# Patient Record
Sex: FEMALE | Race: WHITE
[De-identification: names, ages, dates, MRNs, and addresses within clinical notes are randomized per-mention and may not be internally consistent; named-entity substitution may affect disease eponyms.]

---

## 2018-09-16 ENCOUNTER — HOSPITAL ENCOUNTER (EMERGENCY)
Dept: HOSPITAL 33 - ED | Age: 58
Discharge: ANOTHER HOSPITAL | End: 2018-09-16
Payer: COMMERCIAL

## 2018-09-16 VITALS — DIASTOLIC BLOOD PRESSURE: 62 MMHG | HEART RATE: 64 BPM | OXYGEN SATURATION: 95 % | SYSTOLIC BLOOD PRESSURE: 98 MMHG
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
## 2018-09-16 DIAGNOSIS — Z85.01: ICD-10-CM
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
## 2018-09-16 DIAGNOSIS — J95.02: Primary | ICD-10-CM

## 2018-09-16 LAB
ALBUMIN SERPL-MCNC: 3.7 G/DL (ref 3.5–5)
ALP SERPL-CCNC: 135 U/L (ref 38–126)
ALT SERPL-CCNC: 14 U/L (ref 0–35)
ANION GAP SERPL CALC-SCNC: 14.8 MEQ/L (ref 5–15)
ANISOCYTOSIS BLD QL: (no result)
AST SERPL QL: 21 U/L (ref 14–36)
BILIRUB BLD-MCNC: 0.5 MG/DL (ref 0.2–1.3)
BUN SERPL-MCNC: 9 MG/DL (ref 7–17)
CALCIUM SPEC-MCNC: 10.7 MG/DL (ref 8.4–10.2)
CELLS COUNTED: 100
CHLORIDE SERPL-SCNC: 94 MMOL/L (ref 98–107)
CO2 SERPL-SCNC: 28 MMOL/L (ref 22–30)
CREAT SERPL-MCNC: 0.62 MG/DL (ref 0.52–1.04)
GLUCOSE SERPL-MCNC: 107 MG/DL (ref 74–106)
GLUCOSE UR-MCNC: NEGATIVE MG/DL
GRANULOCYTES # BLD AUTO: 15.52 10*3/UL (ref 1.4–6.9)
HCT VFR BLD AUTO: 27.7 % (ref 35–47)
HGB BLD-MCNC: 8.9 GM/DL (ref 12–16)
MANUAL DIF COMMENT BLD-IMP: (no result)
MCH RBC QN AUTO: 22.9 PG (ref 26–32)
MCHC RBC AUTO-ENTMCNC: 32.1 G/DL (ref 32–36)
NEUTS BAND # BLD MANUAL: 3 % (ref 0–2)
PLATELET # BLD AUTO: 661 K/MM3 (ref 150–450)
POIKILOCYTOSIS BLD QL SMEAR: (no result)
POLYCHROMASIA BLD QL SMEAR: (no result)
POTASSIUM SERPLBLD-SCNC: 3.8 MMOL/L (ref 3.5–5.1)
PROT SERPL-MCNC: 7.4 G/DL (ref 6.3–8.2)
PROT UR STRIP-MCNC: NEGATIVE MG/DL
RBC # BLD AUTO: 3.88 M/MM3 (ref 4.1–5.4)
RBC # URNS HPF: (no result) /HPF (ref 0–2)
SODIUM SERPL-SCNC: 133 MMOL/L (ref 137–145)
T VAGINALIS URNS QL MICRO: PRESENT /HPF
WBC # BLD AUTO: 17.5 K/MM3 (ref 4–10.5)
WBC URNS QL MICRO: (no result) /HPF (ref 0–5)

## 2018-09-16 PROCEDURE — 36415 COLL VENOUS BLD VENIPUNCTURE: CPT

## 2018-09-16 PROCEDURE — 80053 COMPREHEN METABOLIC PANEL: CPT

## 2018-09-16 PROCEDURE — 87077 CULTURE AEROBIC IDENTIFY: CPT

## 2018-09-16 PROCEDURE — 93005 ELECTROCARDIOGRAM TRACING: CPT

## 2018-09-16 PROCEDURE — 84484 ASSAY OF TROPONIN QUANT: CPT

## 2018-09-16 PROCEDURE — 71046 X-RAY EXAM CHEST 2 VIEWS: CPT

## 2018-09-16 PROCEDURE — 99285 EMERGENCY DEPT VISIT HI MDM: CPT

## 2018-09-16 PROCEDURE — 96360 HYDRATION IV INFUSION INIT: CPT

## 2018-09-16 PROCEDURE — 85025 COMPLETE CBC W/AUTO DIFF WBC: CPT

## 2018-09-16 PROCEDURE — 70491 CT SOFT TISSUE NECK W/DYE: CPT

## 2018-09-16 PROCEDURE — 71260 CT THORAX DX C+: CPT

## 2018-09-16 PROCEDURE — 83605 ASSAY OF LACTIC ACID: CPT

## 2018-09-16 PROCEDURE — 96361 HYDRATE IV INFUSION ADD-ON: CPT

## 2018-09-16 PROCEDURE — 87086 URINE CULTURE/COLONY COUNT: CPT

## 2018-09-16 PROCEDURE — 87040 BLOOD CULTURE FOR BACTERIA: CPT

## 2018-09-16 PROCEDURE — 87070 CULTURE OTHR SPECIMN AEROBIC: CPT

## 2018-09-16 PROCEDURE — 81000 URINALYSIS NONAUTO W/SCOPE: CPT

## 2018-09-16 PROCEDURE — 36000 PLACE NEEDLE IN VEIN: CPT

## 2018-09-16 PROCEDURE — 87186 SC STD MICRODIL/AGAR DIL: CPT

## 2018-09-16 NOTE — ERPHSYRPT
- History of Present Illness


Time Seen by Provider: 09/16/18 13:04


Source: patient, EMS


Exam Limitations: no limitations


Physician History: 





The patient is a 58-year-old female with a history of throat and esophageal 

cancer is brought in from home by ambulance where she fell onto her buttocks 

and also hitting her head on the floor.  She has a tracheostomy.  At this time 

she has no specific complaints.  She states she cleans her tracheostomy site 2 

times a day; however there is a very foul odor coming from the site around the 

tracheostomy when you enter the room.  Her past medical history significant for 

throat and esophageal cancer.  She is undergoing chemotherapy for the cancer.  

She has her next chemotherapy session on Thursday.


Timing/Duration: today


Severity: mild


Modifying Factors: Improves With: nothing


Associated Symptoms: denies symptoms


Allergies/Adverse Reactions: 








Sulfa (Sulfonamide Antibiotics) Allergy (Verified 09/16/18 13:06)


 








- Review of Systems


Constitutional: No Fever, No Chills


Eyes: No Symptoms


Ears, Nose, & Throat: No Symptoms


Respiratory: No Cough, No Dyspnea


Cardiac: No Chest Pain, No Edema, No Syncope


Abdominal/Gastrointestinal: No Abdominal Pain, No Nausea, No Vomiting, No 

Diarrhea


Genitourinary Symptoms: No Dysuria


Musculoskeletal: No Back Pain, No Neck Pain


Skin: No Rash


Neurological: No Dizziness, No Focal Weakness, No Sensory Changes


Psychological: No Symptoms


Endocrine: No Symptoms


Hematologic/Lymphatic: No Symptoms


Immunological/Allergic: No Symptoms


All Other Systems: Reviewed and Negative





- Nursing Vital Signs


Nursing Vital Signs: 


 Initial Vital Signs











Temperature  98.4 F   09/16/18 12:40


 


Pulse Rate  90   09/16/18 12:40


 


Blood Pressure  102/65   09/16/18 12:40


 


O2 Sat by Pulse Oximetry  95   09/16/18 12:40








 Pain Scale











Pain Intensity                 0

















- Physical Exam


General Appearance: no apparent distress, alert


Eye Exam: PERRL/EOMI, eyes nml inspection


Ears, Nose, Throat Exam: other (Examination of the patient's neck: Respiratory 

therapy removed the trach.  There was significant amounts of foul-smelling 

discharge around the tracheostomy site.  There was thick exudate on the trach 

itself.  There is a open lesion at the base of the front of the left side of 

the neck with granulomatous tissue surrounding the open lesion.)


Neck Exam: other (see ENT)


Respiratory Exam: normal breath sounds, lungs clear, No respiratory distress


Cardiovascular Exam: regular rate/rhythm, normal heart sounds, normal 

peripheral pulses


Gastrointestinal/Abdomen Exam: soft, normal bowel sounds, No tenderness, No mass


Pelvic Exam: not done


Rectal Exam: not done


Back Exam: normal inspection, normal range of motion, No CVA tenderness, No 

vertebral tenderness


Extremity Exam: normal inspection, normal range of motion, pelvis stable


Neurologic Exam: alert, oriented x 3, cooperative, normal mood/affect, nml 

cerebellar function, nml station & gait, sensation nml, No motor deficits


Skin Exam: other (see ENT)


Lymphatic Exam: No adenopathy


**SpO2 Interpretation**: normal


Oxygen Delivery: Room Air





- Course


EKG Interpreted by Me: RATE, Sinus Rhythm, NORMAL AXIS, NORMAL INTERVALS, 

NORMAL QRS, NORMAL ST-T





- CT Exams


  ** Soft Tissue Neck


CT Interpretation: Tele-radiologist Report (per Dr Ellis), Other (lobulated 

soft tissue mass in region of hypopharynx; small ostomy in left lateral neckin 

region of lobulated mass)





  ** Chest


CT Interpretation: Tele-radiologist Report (per Dr Ellis), Other (COPD; mild 

ground glass opacitiesl)


Ordered Tests: 


 Active Orders 24 hr











 Category Date Time Status


 


 Clean Catch Urine Specimen STAT Care  09/16/18 13:11 Active


 


 EKG-ER Only STAT Care  09/16/18 13:11 Active


 


 IV Insertion STAT Care  09/16/18 13:11 Active


 


 CHEST 2 VIEWS (PA AND LAT) Stat Exams  09/16/18 14:07 Taken


 


 CHEST WITH CONTRAST [CT] Stat Exams  09/16/18 15:18 Taken


 


 NECK WITH CONTRAST [CT] Stat Exams  09/16/18 14:18 Taken


 


 BLOOD CULTURE Stat Lab  09/16/18 13:46 Received


 


 CBC W DIFF Stat Lab  09/16/18 13:40 Completed


 


 CMP Stat Lab  09/16/18 13:40 Completed


 


 CULTURE,URINE Stat Lab  09/16/18 13:52 Received


 


 CULTURE,WOUND Stat Lab  09/16/18 13:00 Received


 


 Lactic Acid Stat Lab  09/16/18 13:42 Completed


 


 Manual Differential NC Stat Lab  09/16/18 13:40 Completed


 


 TROPONIN Q3H Lab  09/16/18 13:15 Completed


 


 TROPONIN Q3H Lab  09/16/18 16:18 Completed


 


 TROPONIN Q3H Lab  09/16/18 19:15 Ordered


 


 TROPONIN Q3H Lab  09/16/18 22:15 Ordered


 


 TROPONIN Q3H Lab  09/17/18 01:15 Ordered


 


 UA W/ MICROSCOPIC Stat Lab  09/16/18 13:52 Completed








Medication Summary











Generic Name Dose Route Start Last Admin





  Trade Name Freq  PRN Reason Stop Dose Admin


 


Sodium Chloride  1,000 mls @ 999 mls/hr  09/16/18 18:06  09/16/18 18:10





  Sodium Chloride 0.9% 1000 Ml  IV  09/16/18 19:06  999 mls/hr





  .Q1H1M STA   Administration





     





     





     





     














Discontinued Medications














Generic Name Dose Route Start Last Admin





  Trade Name Freq  PRN Reason Stop Dose Admin


 


Hydrocodone Bitart/Acetaminophen  1 tab  09/16/18 15:39  09/16/18 15:57





  Norco 5/325 Mg***  PO  09/16/18 15:40  1 tab





  STAT ONE   Administration





     





     





     





     


 


Hydrocodone Bitart/Acetaminophen  Confirm  09/16/18 15:44  





  Norco 5/325 Mg***  Administered  09/16/18 15:45  





  Dose   





  1 tab   





  .ROUTE   





  .STK-MED ONE   





     





     





     





     


 


Sodium Chloride  Confirm  09/16/18 18:07  





  Sodium Chloride 0.9% 1000 Ml  Administered  09/16/18 18:08  





  Dose   





  1,000 mls @ ud   





  .ROUTE   





  .STK-MED ONE   





     





     





     





     











Lab/Rad Data: 


 Laboratory Result Diagrams





 09/16/18 13:40 





 09/16/18 13:40 





 Laboratory Results











  09/16/18 09/16/18 09/16/18 Range/Units





  16:18 13:52 13:42 


 


WBC     (4.0-10.5)  K/mm3


 


RBC     (4.1-5.4)  M/mm3


 


Hgb     (12.0-16.0)  gm/dl


 


Hct     (35-47)  %


 


MCV     ()  fl


 


MCH     (26-32)  pg


 


MCHC     (32-36)  g/dl


 


RDW     (11.5-14.0)  %


 


Plt Count     (150-450)  K/mm3


 


MPV     (6-9.5)  fl


 


Absolute Granulocytes     (1.4-6.9)  


 


Segmented Neutrophils     (36.0-66.0)  %


 


Band Neutrophils     (0.0-2.0)  %


 


Lymphocytes (Manual)     (24-44)  %


 


Monocytes (Manual)     (0.0-12.0)  %


 


Eosinophils (Manual)     (0.00-3.0)  %


 


Hypochromia     


 


Platelet Estimate     (NORMAL)  


 


RBC Morphology     


 


Polychromasia     


 


Poikilocytosis     


 


Anisocytosis     


 


Sodium     (137-145)  mmol/L


 


Potassium     (3.5-5.1)  mmol/L


 


Chloride     ()  mmol/L


 


Carbon Dioxide     (22-30)  mmol/L


 


Anion Gap     (5-15)  MEQ/L


 


BUN     (7-17)  mg/dL


 


Creatinine     (0.52-1.04)  mg/dL


 


Estimated GFR     ML/MIN


 


Glucose     ()  mg/dL


 


Lactic Acid    1.5  (0.4-2.0)  


 


Calcium     (8.4-10.2)  mg/dL


 


Total Bilirubin     (0.2-1.3)  mg/dL


 


AST     (14-36)  U/L


 


ALT     (0-35)  U/L


 


Alkaline Phosphatase     ()  U/L


 


Troponin I  < 0.012    (0.000-0.034)  ng/mL


 


Serum Total Protein     (6.3-8.2)  g/dL


 


Albumin     (3.5-5.0)  g/dL


 


Ur Collection Type   CLEAN CATCH   


 


Urine Color   YELLOW   (YELLOW)  


 


Urine Appearance   CLEAR   (CLEAR)  


 


Urine pH   5.0   (5-6)  


 


Ur Specific Gravity   1.010   (1.005-1.025)  


 


Urine Protein   NEGATIVE   (Negative)  


 


Urine Ketones   NEGATIVE   (NEGATIVE)  


 


Urine Blood   NEGATIVE   (0-5)  Bridger/ul


 


Urine Nitrite   NEGATIVE   (NEGATIVE)  


 


Urine Bilirubin   NEGATIVE   (NEGATIVE)  


 


Urine Urobilinogen   NORMAL   (0-1)  mg/dL


 


Ur Leukocyte Esterase   2+   (NEGATIVE)  


 


Urine Microscopic RBC   2-5   (0-2)  /HPF


 


Urine Microscopic WBC   15-25   (0-5)  /HPF


 


Ur Epithelial Cells   MODERATE   (FEW)  /HPF


 


Urine Bacteria   MODERATE   (NEGATIVE)  /HPF


 


Urine Mucus   SLIGHT   (NEGATIVE)  /HPF


 


Urine Trichomonas   PRESENT   (NEGATIVE)  /HPF


 


Urine Culture Reflexed   YES   (NO)  


 


Urine Glucose   NEGATIVE   (NEGATIVE)  mg/dL














  09/16/18 09/16/18 09/16/18 Range/Units





  13:40 13:40 13:15 


 


WBC   17.5 H   (4.0-10.5)  K/mm3


 


RBC   3.88 L   (4.1-5.4)  M/mm3


 


Hgb   8.9 L   (12.0-16.0)  gm/dl


 


Hct   27.7 L   (35-47)  %


 


MCV   71.4 L   ()  fl


 


MCH   22.9 L   (26-32)  pg


 


MCHC   32.1   (32-36)  g/dl


 


RDW   17.2 H   (11.5-14.0)  %


 


Plt Count   661 H   (150-450)  K/mm3


 


MPV   8.8   (6-9.5)  fl


 


Absolute Granulocytes   15.52 H   (1.4-6.9)  


 


Segmented Neutrophils   87 H   (36.0-66.0)  %


 


Band Neutrophils   3 H   (0.0-2.0)  %


 


Lymphocytes (Manual)   6 L   (24-44)  %


 


Monocytes (Manual)   2   (0.0-12.0)  %


 


Eosinophils (Manual)   2   (0.00-3.0)  %


 


Hypochromia   1+   


 


Platelet Estimate   INCREASED   (NORMAL)  


 


RBC Morphology   ABNORMAL   


 


Polychromasia   1+   


 


Poikilocytosis   1+   


 


Anisocytosis   1+   


 


Sodium  133 L    (137-145)  mmol/L


 


Potassium  3.8    (3.5-5.1)  mmol/L


 


Chloride  94 L    ()  mmol/L


 


Carbon Dioxide  28    (22-30)  mmol/L


 


Anion Gap  14.8    (5-15)  MEQ/L


 


BUN  9    (7-17)  mg/dL


 


Creatinine  0.62    (0.52-1.04)  mg/dL


 


Estimated GFR  > 60.0    ML/MIN


 


Glucose  107 H    ()  mg/dL


 


Lactic Acid     (0.4-2.0)  


 


Calcium  10.7 H    (8.4-10.2)  mg/dL


 


Total Bilirubin  0.50    (0.2-1.3)  mg/dL


 


AST  21    (14-36)  U/L


 


ALT  14    (0-35)  U/L


 


Alkaline Phosphatase  135 H    ()  U/L


 


Troponin I    < 0.012  (0.000-0.034)  ng/mL


 


Serum Total Protein  7.4    (6.3-8.2)  g/dL


 


Albumin  3.7    (3.5-5.0)  g/dL


 


Ur Collection Type     


 


Urine Color     (YELLOW)  


 


Urine Appearance     (CLEAR)  


 


Urine pH     (5-6)  


 


Ur Specific Gravity     (1.005-1.025)  


 


Urine Protein     (Negative)  


 


Urine Ketones     (NEGATIVE)  


 


Urine Blood     (0-5)  Bridger/ul


 


Urine Nitrite     (NEGATIVE)  


 


Urine Bilirubin     (NEGATIVE)  


 


Urine Urobilinogen     (0-1)  mg/dL


 


Ur Leukocyte Esterase     (NEGATIVE)  


 


Urine Microscopic RBC     (0-2)  /HPF


 


Urine Microscopic WBC     (0-5)  /HPF


 


Ur Epithelial Cells     (FEW)  /HPF


 


Urine Bacteria     (NEGATIVE)  /HPF


 


Urine Mucus     (NEGATIVE)  /HPF


 


Urine Trichomonas     (NEGATIVE)  /HPF


 


Urine Culture Reflexed     (NO)  


 


Urine Glucose     (NEGATIVE)  mg/dL














- Progress


Progress Note: 





09/16/18 17:27


Discussed pt with Dr Murray at [facility] who declines acceptance because they do 

no have ENT coverage this week.


09/16/18 17:56


Discussed pt with [facility] and they decline because they have no ENT coverage 

this week.





Discussed pt with Dr Garza and Dr Meredith (ENT) at [facility]. ENT believes 

pt is best served at [facility] with her oncologist. 


09/16/18 18:02


Discussed pt again with Dr Murray who again declines.


09/16/18 18:54


Dr Infante accepts pt at [facility].





- Departure


Time of Disposition: 18:54


Departure Disposition: Transfer (Transfer to [facility] per Dr Infante)


Clinical Impression: 


 Neck infection





Condition: Stable


Critical Care Time: No


Referrals: 


NIURKA ETIENNE [Primary Care Provider] -

## 2018-09-16 NOTE — XRAY
Indication: Cough.  Throat infection.  History throat cancer with surgery 6

months ago.



Multiple contiguous axial images obtained through the neck using 40 cc Isovue

370 contrast.



Comparison: None



Patient is edentulous.  Hypopharynx demonstrates lobular soft tissue mass at

least 2.0 x 3.3 cm in greatest axial dimension narrowing the hypopharynx.

Significant soft tissue changes including induration and surgical clips

presumably related to surgery.  There is left lateral ostomy defect at the

base of the neck presumed from previous tracheostomy cannula.  Additional

anterior tracheostomy cannula seen at the T3 level with tip in the trachea.

Thyroid gland presumed surgically absent.  No obvious pathologic

lymphadenopathy.  Minimal carotid calcifications bilaterally.



Cervical spine intact with mild multilevel degenerative changes and cervical

lordotic reversal, positional versus paraspinal spasm.  No suspicious bony

lesions.  Base of the brain unremarkable.  Moderate right maxillary sinus

mucosal thickening with fluid leveling.  Incompletely visualized opacified

right sphenoid sinus.



CT chest reported separately.



Impression:

1.  Lobulated soft tissue mass in the hypopharynx as detailed.

2.  Postsurgical changes including left lateral ostomy defect and anterior

tracheostomy cannula.

3.  Paranasal sinus disease.

4.  Multilevel cervical degenerative spondylosis and cervical lordotic

reversal.



Comment: Preliminary interpretation was made by [facility].  No critical discrepancy.



CTDI 6.11

## 2018-09-16 NOTE — XRAY
Indication: Cough.  Throat infection.  History throat cancer.



Multiple contiguous axial images obtained through the chest using 70 cc Isovue

370 contrast.



Comparison: None



CT neck reported separately.



Diffuse pulmonary emphysema greatest in both upper lobes.  Mild bibasilar

dependent atelectasis, right greater than left.  No suspicious pulmonary mass,

infiltrate, or effusion.  Heart is not enlarged.  Aorta is normal in course

and caliber without aneurysm/dissection.  Left-sided Port-A-Cath.  A few left

hilar calcified nodes.  No pathologic mediastinal/hilar lymphadenopathy.



Bony thorax intact.  No suspicious bony lesions.



Limited upper abdomen demonstrates a few tiny calcified splenic granulomas and

tiny 3-4 mm right renal cyst.



Impression:

1.  No acute cardiopulmonary abnormalities or pathologic lymphadenopathy.

2.  Pulmonary emphysema and evidence for old granulomatous disease.

3.  Tiny right renal cyst.



Comment: Preliminary interpretation was made by C.  No critical discrepancy.



CTDI 7.50

## 2024-07-08 NOTE — XRAY
Indication: Cough.  Green mucous coming out of ostomy.



Comparison: None



PA/lateral chest hyperinflated and clear with a few incidental calcified

granulomas and bilateral nipple shadows.  Heart is not enlarged with

left-sided Port-A-Cath.  Numerous bilateral neck surgical clips.  Bony thorax

intact with minimal degenerative changes.



Impression: Nonacute hyperinflated chest with chronic features. pregnant